# Patient Record
Sex: MALE | Race: WHITE | NOT HISPANIC OR LATINO | Employment: FULL TIME | URBAN - METROPOLITAN AREA
[De-identification: names, ages, dates, MRNs, and addresses within clinical notes are randomized per-mention and may not be internally consistent; named-entity substitution may affect disease eponyms.]

---

## 2019-11-22 ENCOUNTER — HOSPITAL ENCOUNTER (EMERGENCY)
Facility: HOSPITAL | Age: 19
Discharge: HOME | End: 2019-11-22
Attending: EMERGENCY MEDICINE
Payer: MEDICAID

## 2019-11-22 VITALS
HEART RATE: 94 BPM | HEIGHT: 69 IN | DIASTOLIC BLOOD PRESSURE: 73 MMHG | BODY MASS INDEX: 22.25 KG/M2 | SYSTOLIC BLOOD PRESSURE: 124 MMHG | RESPIRATION RATE: 18 BRPM | WEIGHT: 150.25 LBS | OXYGEN SATURATION: 100 % | TEMPERATURE: 99 F

## 2019-11-22 DIAGNOSIS — J06.9 UPPER RESPIRATORY TRACT INFECTION, UNSPECIFIED TYPE: Primary | ICD-10-CM

## 2019-11-22 LAB
DEPRECATED S PYO AG THROAT QL EIA: NEGATIVE
INFLUENZA A, MOLECULAR: NEGATIVE
INFLUENZA B, MOLECULAR: NEGATIVE
SPECIMEN SOURCE: NORMAL

## 2019-11-22 PROCEDURE — 87081 CULTURE SCREEN ONLY: CPT | Mod: ER

## 2019-11-22 PROCEDURE — 87880 STREP A ASSAY W/OPTIC: CPT | Mod: ER

## 2019-11-22 PROCEDURE — 87502 INFLUENZA DNA AMP PROBE: CPT | Mod: ER

## 2019-11-22 PROCEDURE — 99284 EMERGENCY DEPT VISIT MOD MDM: CPT | Mod: ER

## 2019-11-22 RX ORDER — FLUTICASONE PROPIONATE 50 MCG
1 SPRAY, SUSPENSION (ML) NASAL 2 TIMES DAILY PRN
Qty: 15 G | Refills: 0 | OUTPATIENT
Start: 2019-11-22 | End: 2020-09-24

## 2019-11-22 RX ORDER — BENZONATATE 100 MG/1
200 CAPSULE ORAL 3 TIMES DAILY PRN
Qty: 20 CAPSULE | Refills: 0 | Status: SHIPPED | OUTPATIENT
Start: 2019-11-22 | End: 2019-12-02

## 2019-11-22 NOTE — DISCHARGE INSTRUCTIONS
Supportive treatment drink plenty of fluids rest Motrin Tylenol for fevers decongestants up primary doctor next 1-2 days.

## 2019-11-22 NOTE — ED PROVIDER NOTES
Encounter Date: 11/22/2019       History     Chief Complaint   Patient presents with    Cough     Pt with c/o cough, body aches, chills, headache and nausea since Wednesday.     Chills    Headache     19-year-old male presented with complaint of cough body aches chills headache since Wednesday evening.  The patient did not receive flu shot this season sore throat as well        Review of patient's allergies indicates:  No Known Allergies  History reviewed. No pertinent past medical history.  History reviewed. No pertinent surgical history.  History reviewed. No pertinent family history.  Social History     Tobacco Use    Smoking status: Current Every Day Smoker     Packs/day: 0.50    Smokeless tobacco: Never Used   Substance Use Topics    Alcohol use: Yes     Frequency: Never     Comment: Occ    Drug use: Never     Review of Systems   Constitutional: Positive for fever.   HENT: Positive for congestion, rhinorrhea and sore throat.    Respiratory: Positive for cough.    Skin: Negative.    All other systems reviewed and are negative.      Physical Exam     Initial Vitals [11/22/19 1135]   BP Pulse Resp Temp SpO2   124/73 94 18 98.5 °F (36.9 °C) 100 %      MAP       --         Physical Exam    Nursing note and vitals reviewed.  Constitutional: He appears well-developed.   HENT:   Head: Normocephalic and atraumatic.   Eyes: EOM are normal. Pupils are equal, round, and reactive to light.   Neck: Normal range of motion. Neck supple.   Cardiovascular: Normal rate and regular rhythm.   Pulmonary/Chest: Breath sounds normal.   Abdominal: Soft. Bowel sounds are normal.   Neurological: He is alert and oriented to person, place, and time.   Skin: Skin is warm and dry.   Psychiatric: He has a normal mood and affect.         ED Course   Procedures  Labs Reviewed   INFLUENZA A & B BY MOLECULAR   THROAT SCREEN, RAPID   CULTURE, STREP A,  THROAT          Imaging Results    None          Medical Decision Making:   Differential  Diagnosis:   Strep   flu   URI   pneumonia  Clinical Tests:   Lab Tests: Ordered and Reviewed  ED Management:  Discussed results with patient advised continue supportive treatment complaining of fluids decongestants return for any worsening symptoms changes concern follow up with the primary doctor in next 1-2 days or as needed                                 Clinical Impression:       ICD-10-CM ICD-9-CM   1. Upper respiratory tract infection, unspecified type J06.9 465.9         Disposition:   Disposition: Discharged  Condition: Stable                     Aicha Roche PA-C  11/22/19 1234

## 2019-11-24 LAB — BACTERIA THROAT CULT: NORMAL

## 2020-03-06 ENCOUNTER — HOSPITAL ENCOUNTER (EMERGENCY)
Facility: HOSPITAL | Age: 20
Discharge: HOME | End: 2020-03-06
Attending: EMERGENCY MEDICINE
Payer: COMMERCIAL

## 2020-03-06 VITALS
WEIGHT: 140 LBS | DIASTOLIC BLOOD PRESSURE: 67 MMHG | OXYGEN SATURATION: 98 % | HEIGHT: 69 IN | HEART RATE: 96 BPM | SYSTOLIC BLOOD PRESSURE: 124 MMHG | RESPIRATION RATE: 16 BRPM | TEMPERATURE: 98 F | BODY MASS INDEX: 20.73 KG/M2
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
## 2020-03-06 DIAGNOSIS — F10.920 ALCOHOLIC INTOXICATION WITHOUT COMPLICATION: ICD-10-CM

## 2020-03-06 DIAGNOSIS — S16.1XXA CERVICAL STRAIN, ACUTE, INITIAL ENCOUNTER: ICD-10-CM

## 2020-03-06 DIAGNOSIS — M54.9 BACK PAIN: ICD-10-CM

## 2020-03-06 LAB
ALBUMIN SERPL BCP-MCNC: 4.2 G/DL (ref 3.5–5.2)
ALP SERPL-CCNC: 82 U/L (ref 55–135)
ALT SERPL W/O P-5'-P-CCNC: 19 U/L (ref 10–44)
ANION GAP SERPL CALC-SCNC: 10 MMOL/L (ref 8–16)
AST SERPL-CCNC: 24 U/L (ref 10–40)
BASOPHILS # BLD AUTO: 0.05 K/UL (ref 0–0.2)
BASOPHILS NFR BLD: 0.4 % (ref 0–1.9)
BILIRUB SERPL-MCNC: 0.2 MG/DL (ref 0.1–1)
BUN SERPL-MCNC: 10 MG/DL (ref 6–20)
CALCIUM SERPL-MCNC: 9 MG/DL (ref 8.7–10.5)
CHLORIDE SERPL-SCNC: 108 MMOL/L (ref 95–110)
CO2 SERPL-SCNC: 26 MMOL/L (ref 23–29)
CREAT SERPL-MCNC: 1 MG/DL (ref 0.5–1.4)
DIFFERENTIAL METHOD: ABNORMAL
EOSINOPHIL # BLD AUTO: 0 K/UL (ref 0–0.5)
EOSINOPHIL NFR BLD: 0.4 % (ref 0–8)
ERYTHROCYTE [DISTWIDTH] IN BLOOD BY AUTOMATED COUNT: 12 % (ref 11.5–14.5)
EST. GFR  (AFRICAN AMERICAN): >60 ML/MIN/1.73 M^2
EST. GFR  (NON AFRICAN AMERICAN): >60 ML/MIN/1.73 M^2
ETHANOL SERPL-MCNC: 206 MG/DL
GLUCOSE SERPL-MCNC: 91 MG/DL (ref 70–110)
HCT VFR BLD AUTO: 45.1 % (ref 40–54)
HGB BLD-MCNC: 15.2 G/DL (ref 14–18)
IMM GRANULOCYTES # BLD AUTO: 0.06 K/UL (ref 0–0.04)
IMM GRANULOCYTES NFR BLD AUTO: 0.5 % (ref 0–0.5)
LYMPHOCYTES # BLD AUTO: 2 K/UL (ref 1–4.8)
LYMPHOCYTES NFR BLD: 17.5 % (ref 18–48)
MCH RBC QN AUTO: 31.2 PG (ref 27–31)
MCHC RBC AUTO-ENTMCNC: 33.7 G/DL (ref 32–36)
MCV RBC AUTO: 93 FL (ref 82–98)
MONOCYTES # BLD AUTO: 0.9 K/UL (ref 0.3–1)
MONOCYTES NFR BLD: 7.7 % (ref 4–15)
NEUTROPHILS # BLD AUTO: 8.4 K/UL (ref 1.8–7.7)
NEUTROPHILS NFR BLD: 73.5 % (ref 38–73)
NRBC BLD-RTO: 0 /100 WBC
PLATELET # BLD AUTO: 291 K/UL (ref 150–350)
PMV BLD AUTO: 9.2 FL (ref 9.2–12.9)
POTASSIUM SERPL-SCNC: 4.1 MMOL/L (ref 3.5–5.1)
PROT SERPL-MCNC: 7.4 G/DL (ref 6–8.4)
RBC # BLD AUTO: 4.87 M/UL (ref 4.6–6.2)
SODIUM SERPL-SCNC: 144 MMOL/L (ref 136–145)
WBC # BLD AUTO: 11.38 K/UL (ref 3.9–12.7)

## 2020-03-06 PROCEDURE — 85025 COMPLETE CBC W/AUTO DIFF WBC: CPT

## 2020-03-06 PROCEDURE — 96360 HYDRATION IV INFUSION INIT: CPT

## 2020-03-06 PROCEDURE — 25500020 PHARM REV CODE 255: Performed by: EMERGENCY MEDICINE

## 2020-03-06 PROCEDURE — 63600175 PHARM REV CODE 636 W HCPCS: Performed by: EMERGENCY MEDICINE

## 2020-03-06 PROCEDURE — 96361 HYDRATE IV INFUSION ADD-ON: CPT

## 2020-03-06 PROCEDURE — 80320 DRUG SCREEN QUANTALCOHOLS: CPT

## 2020-03-06 PROCEDURE — 80053 COMPREHEN METABOLIC PANEL: CPT

## 2020-03-06 PROCEDURE — 99285 EMERGENCY DEPT VISIT HI MDM: CPT | Mod: 25

## 2020-03-06 RX ORDER — CYCLOBENZAPRINE HCL 10 MG
10 TABLET ORAL 3 TIMES DAILY PRN
Qty: 15 TABLET | Refills: 0 | Status: SHIPPED | OUTPATIENT
Start: 2020-03-06 | End: 2020-03-11

## 2020-03-06 RX ORDER — IBUPROFEN 400 MG/1
400 TABLET ORAL EVERY 6 HOURS PRN
Qty: 20 TABLET | Refills: 0 | Status: SHIPPED | OUTPATIENT
Start: 2020-03-06

## 2020-03-06 RX ADMIN — SODIUM CHLORIDE 1000 ML: 0.9 INJECTION, SOLUTION INTRAVENOUS at 01:03

## 2020-03-06 RX ADMIN — IOHEXOL 75 ML: 350 INJECTION, SOLUTION INTRAVENOUS at 03:03
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
## 2020-03-06 NOTE — DISCHARGE INSTRUCTIONS
Please wear your cervical collar until you receive further instructions from Dr. Mcconnell.  Please don't drink and drive.

## 2020-03-06 NOTE — ED NOTES
Warrant for whole blood from [facility] received from Officer Miguel Montero, verified pt's name and . Sealed kit #[id number] received from Officer Winston. Pt verbally verified name and . Specimen collection process explained to pt. Tourniquet applied to RUE. Area cleaned with non-alcohol containing skin prep from kit. Specimen obtained with sharps and tubes included in kit #[id number]. Sharps removed from pt, tourniquet removed from pt, bleeding controlled, pressure dressing applied to site. Specimens labels with kit #[id number] labels and pt identifiers. Kit #[id number] given to officer Winston, kit sealed, chain of custody complete at 3448.

## 2020-03-13 ENCOUNTER — HOSPITAL ENCOUNTER (EMERGENCY)
Facility: HOSPITAL | Age: 20
Discharge: HOME | End: 2020-03-13
Attending: PEDIATRICS

## 2020-03-13 VITALS
HEIGHT: 69 IN | WEIGHT: 145 LBS | DIASTOLIC BLOOD PRESSURE: 67 MMHG | SYSTOLIC BLOOD PRESSURE: 141 MMHG | RESPIRATION RATE: 18 BRPM | TEMPERATURE: 98 F | HEART RATE: 108 BPM | OXYGEN SATURATION: 100 % | BODY MASS INDEX: 21.48 KG/M2

## 2020-03-13 DIAGNOSIS — R07.81 RIB PAIN: ICD-10-CM

## 2020-03-13 PROCEDURE — 99283 EMERGENCY DEPT VISIT LOW MDM: CPT | Mod: 25

## 2020-03-13 PROCEDURE — 99284 PR EMERGENCY DEPT VISIT,LEVEL IV: ICD-10-PCS | Mod: ,,, | Performed by: PEDIATRICS

## 2020-03-13 PROCEDURE — 99284 EMERGENCY DEPT VISIT MOD MDM: CPT | Mod: ,,, | Performed by: PEDIATRICS

## 2020-03-13 RX ORDER — IBUPROFEN 600 MG/1
600 TABLET ORAL
Status: DISCONTINUED | OUTPATIENT
Start: 2020-03-13 | End: 2020-03-13

## 2020-03-13 RX ORDER — CYCLOBENZAPRINE HCL 10 MG
10 TABLET ORAL 3 TIMES DAILY PRN
COMMUNITY
End: 2020-09-24
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
## 2020-05-07 ENCOUNTER — HOSPITAL ENCOUNTER (EMERGENCY)
Facility: HOSPITAL | Age: 20
Discharge: HOME | End: 2020-05-07
Attending: EMERGENCY MEDICINE

## 2020-05-07 VITALS
RESPIRATION RATE: 16 BRPM | DIASTOLIC BLOOD PRESSURE: 73 MMHG | BODY MASS INDEX: 22.96 KG/M2 | SYSTOLIC BLOOD PRESSURE: 124 MMHG | HEART RATE: 88 BPM | OXYGEN SATURATION: 100 % | HEIGHT: 69 IN | TEMPERATURE: 98 F | WEIGHT: 155 LBS

## 2020-05-07 DIAGNOSIS — R10.9 RIGHT FLANK PAIN: Primary | ICD-10-CM

## 2020-05-07 LAB
ALBUMIN SERPL BCP-MCNC: 4.4 G/DL (ref 3.5–5.2)
ALP SERPL-CCNC: 76 U/L (ref 55–135)
ALT SERPL W/O P-5'-P-CCNC: 16 U/L (ref 10–44)
ANION GAP SERPL CALC-SCNC: 7 MMOL/L (ref 8–16)
AST SERPL-CCNC: 19 U/L (ref 10–40)
BASOPHILS # BLD AUTO: 0.04 K/UL (ref 0–0.2)
BASOPHILS NFR BLD: 0.6 % (ref 0–1.9)
BILIRUB SERPL-MCNC: 0.5 MG/DL (ref 0.1–1)
BILIRUB UR QL STRIP: NEGATIVE
BUN SERPL-MCNC: 12 MG/DL (ref 6–20)
CALCIUM SERPL-MCNC: 10.1 MG/DL (ref 8.7–10.5)
CHLORIDE SERPL-SCNC: 101 MMOL/L (ref 95–110)
CLARITY UR: CLEAR
CO2 SERPL-SCNC: 30 MMOL/L (ref 23–29)
COLOR UR: YELLOW
CREAT SERPL-MCNC: 1.3 MG/DL (ref 0.5–1.4)
DIFFERENTIAL METHOD: ABNORMAL
EOSINOPHIL # BLD AUTO: 0.1 K/UL (ref 0–0.5)
EOSINOPHIL NFR BLD: 2.2 % (ref 0–8)
ERYTHROCYTE [DISTWIDTH] IN BLOOD BY AUTOMATED COUNT: 11.9 % (ref 11.5–14.5)
EST. GFR  (AFRICAN AMERICAN): >60 ML/MIN/1.73 M^2
EST. GFR  (NON AFRICAN AMERICAN): >60 ML/MIN/1.73 M^2
GLUCOSE SERPL-MCNC: 96 MG/DL (ref 70–110)
GLUCOSE UR QL STRIP: NEGATIVE
HCT VFR BLD AUTO: 46.4 % (ref 40–54)
HGB BLD-MCNC: 15.8 G/DL (ref 14–18)
HGB UR QL STRIP: NEGATIVE
IMM GRANULOCYTES # BLD AUTO: 0.02 K/UL (ref 0–0.04)
IMM GRANULOCYTES NFR BLD AUTO: 0.3 % (ref 0–0.5)
KETONES UR QL STRIP: NEGATIVE
LEUKOCYTE ESTERASE UR QL STRIP: NEGATIVE
LYMPHOCYTES # BLD AUTO: 2.3 K/UL (ref 1–4.8)
LYMPHOCYTES NFR BLD: 37 % (ref 18–48)
MCH RBC QN AUTO: 31.6 PG (ref 27–31)
MCHC RBC AUTO-ENTMCNC: 34.1 G/DL (ref 32–36)
MCV RBC AUTO: 93 FL (ref 82–98)
MONOCYTES # BLD AUTO: 0.5 K/UL (ref 0.3–1)
MONOCYTES NFR BLD: 8.6 % (ref 4–15)
NEUTROPHILS # BLD AUTO: 3.2 K/UL (ref 1.8–7.7)
NEUTROPHILS NFR BLD: 51.3 % (ref 38–73)
NITRITE UR QL STRIP: NEGATIVE
NRBC BLD-RTO: 0 /100 WBC
PH UR STRIP: 6 [PH] (ref 5–8)
PLATELET # BLD AUTO: 289 K/UL (ref 150–350)
PMV BLD AUTO: 9.1 FL (ref 9.2–12.9)
POTASSIUM SERPL-SCNC: 4.3 MMOL/L (ref 3.5–5.1)
PROT SERPL-MCNC: 7.6 G/DL (ref 6–8.4)
PROT UR QL STRIP: NEGATIVE
RBC # BLD AUTO: 5 M/UL (ref 4.6–6.2)
SODIUM SERPL-SCNC: 138 MMOL/L (ref 136–145)
SP GR UR STRIP: 1.02 (ref 1–1.03)
URN SPEC COLLECT METH UR: NORMAL
UROBILINOGEN UR STRIP-ACNC: NEGATIVE EU/DL
WBC # BLD AUTO: 6.3 K/UL (ref 3.9–12.7)

## 2020-05-07 PROCEDURE — 96361 HYDRATE IV INFUSION ADD-ON: CPT

## 2020-05-07 PROCEDURE — 63600175 PHARM REV CODE 636 W HCPCS: Performed by: EMERGENCY MEDICINE

## 2020-05-07 PROCEDURE — 25000003 PHARM REV CODE 250: Performed by: EMERGENCY MEDICINE

## 2020-05-07 PROCEDURE — 80053 COMPREHEN METABOLIC PANEL: CPT

## 2020-05-07 PROCEDURE — 99284 EMERGENCY DEPT VISIT MOD MDM: CPT | Mod: 25

## 2020-05-07 PROCEDURE — 85025 COMPLETE CBC W/AUTO DIFF WBC: CPT

## 2020-05-07 PROCEDURE — 81003 URINALYSIS AUTO W/O SCOPE: CPT

## 2020-05-07 PROCEDURE — 96375 TX/PRO/DX INJ NEW DRUG ADDON: CPT

## 2020-05-07 PROCEDURE — 96374 THER/PROPH/DIAG INJ IV PUSH: CPT

## 2020-05-07 RX ORDER — ONDANSETRON 2 MG/ML
4 INJECTION INTRAMUSCULAR; INTRAVENOUS
Status: COMPLETED | OUTPATIENT
Start: 2020-05-07 | End: 2020-05-07

## 2020-05-07 RX ORDER — KETOROLAC TROMETHAMINE 30 MG/ML
15 INJECTION, SOLUTION INTRAMUSCULAR; INTRAVENOUS
Status: COMPLETED | OUTPATIENT
Start: 2020-05-07 | End: 2020-05-07

## 2020-05-07 RX ORDER — ONDANSETRON 4 MG/1
4 TABLET, ORALLY DISINTEGRATING ORAL EVERY 6 HOURS PRN
Qty: 12 TABLET | Refills: 0 | OUTPATIENT
Start: 2020-05-07 | End: 2020-09-24

## 2020-05-07 RX ADMIN — KETOROLAC TROMETHAMINE 15 MG: 30 INJECTION, SOLUTION INTRAMUSCULAR; INTRAVENOUS at 08:05

## 2020-05-07 RX ADMIN — ONDANSETRON 4 MG: 2 INJECTION INTRAMUSCULAR; INTRAVENOUS at 08:05

## 2020-05-07 RX ADMIN — SODIUM CHLORIDE 1000 ML: 0.9 INJECTION, SOLUTION INTRAVENOUS at 08:05

## 2020-05-07 NOTE — ED NOTES
Patient stated that he has RLQ abdominal pain that radiates to his right flank area that started tues. Patient stated the pain is constant with nausea and vomiting. Patient denies any difficulty urinating but has had frequency with urination. Patient stated he has vomited x 4 episodes today and last po intake was around 630 am. VSS, will continue to [facility]

## 2020-05-07 NOTE — ED PROVIDER NOTES
Encounter Date: 5/7/2020    SCRIBE #1 NOTE: I, Dominic Bauman , am scribing for, and in the presence of,  . I have scribed the entire note.       History     Chief Complaint   Patient presents with    Abdominal Pain     cc abdominal pain that started on tues and has worsened. patient denies fever chills, sob, coughing    Emesis     vomiting since tues, patient vomited x4 episodes today. last po intake this am 630     Edward Villa is a 20 y.o. male who  has no past medical history on file.    The patient presents to the ED due to abdominal pain and vomiting that started 2 days ago. Symptoms has worsened since onset. He describes the abdominal pain as being in his intestines and radiating toward the back.This morning the patient experienced x4 episodes of vomiting. He denies any fever, chills, SOB, coughing or blood in the urine. Patient admits that his last PO intake was this morning around 6am.        The history is provided by the patient.     Review of patient's allergies indicates:  No Known Allergies  No past medical history on file.  No past surgical history on file.  No family history on file.  Social History     Tobacco Use    Smoking status: Current Every Day Smoker     Packs/day: 0.50    Smokeless tobacco: Never Used   Substance Use Topics    Alcohol use: Yes     Frequency: Never     Comment: Occ    Drug use: Never     Review of Systems   Constitutional: Negative for chills and fever.   Respiratory: Negative for cough and shortness of breath.    Gastrointestinal: Positive for abdominal pain and vomiting.   Genitourinary: Negative for dysuria.   All other systems reviewed and are negative.      Physical Exam     Initial Vitals [05/07/20 0804]   BP Pulse Resp Temp SpO2   124/73 88 16 98.3 °F (36.8 °C) 100 %      MAP       --         Physical Exam    Constitutional: He appears well-developed and well-nourished.   HENT:   Head: Normocephalic and atraumatic.   Eyes: Pupils are equal, round, and  reactive to light.   Cardiovascular: Normal rate, regular rhythm and normal heart sounds.   Pulmonary/Chest: Breath sounds normal.   Abdominal: Soft. Normal appearance and bowel sounds are normal. There is tenderness in the right lower quadrant. There is no rebound and no guarding.   Musculoskeletal: He exhibits tenderness.   Right CVA tenderness    Neurological: He is alert and oriented to person, place, and time.   Skin: Skin is warm.         ED Course   Procedures  Labs Reviewed   CBC W/ AUTO DIFFERENTIAL - Abnormal; Notable for the following components:       Result Value    Mean Corpuscular Hemoglobin 31.6 (*)     MPV 9.1 (*)     All other components within normal limits   COMPREHENSIVE METABOLIC PANEL - Abnormal; Notable for the following components:    CO2 30 (*)     Anion Gap 7 (*)     All other components within normal limits   URINALYSIS          Imaging Results          CT Renal Stone Study ABD Pelvis WO (Final result)  Result time 05/07/20 09:26:08    Final result by Erwin Dunbar MD (05/07/20 09:26:08)                 Impression:      1. Small nonobstructing stone in right kidney.  No ureter stone or obstruction on either side.  2. No acute abnormality identified in abdomen or pelvis.      Electronically signed by: Erwin Dunbar MD  Date:    05/07/2020  Time:    09:26             Narrative:    EXAMINATION:  CT RENAL STONE STUDY ABD PELVIS WO    CLINICAL HISTORY:  Flank pain, stone disease suspected;    TECHNIQUE:  Low dose axial images, sagittal and coronal reformations were obtained from the lung bases to the pubic symphysis.  Contrast was not administered.    COMPARISON:  CT chest abdomen pelvis 03/06/2020    FINDINGS:  Visualized lung bases are clear.  No free air is seen in the upper abdomen.    The spleen, liver, gallbladder, bile ducts, and pancreas are within normal limits on less sensitive, noncontrast examination.    Adrenal glands are normal.    Left kidney is normal size and shows  no stone, obstruction, or obvious mass.  Left ureter is normal caliber and clear to the bladder.    The right kidney is normal size and has a 3 mm nonobstructing stone in the upper pole.  No obvious renal mass is detected.  Right ureter is normal caliber and clear to the bladder.    Wall of the urinary bladder is smooth.  Prostate and seminal vesicles look normal.    Abdominal aorta tapers normally.  No enlarged retroperitoneal lymph nodes are seen.    No significant ascites is identified in the abdomen or pelvis.  Intestinal tract shows no obstruction or acute inflammatory process.  Some fluid is present in the stomach.  Small bowel is normal caliber.  Normal appendix is visualized (coronal series 601, image 35).  Stool is present throughout the colon.    Soft tissues of the abdominal wall look normal.    Skeletal structures are intact.                                 Medical Decision Making:   Clinical Tests:   Lab Tests: Reviewed and Ordered  ED Management:  20-year-old male with right flank pain and vomiting.  Lab work is unremarkable.  CT shows a normal appendix.  There is a nonobstructing stone in the right kidney, but none seen in the ureter and there is no hydronephrosis or hydroureter.  Re-examination of the patient yields no abdominal tenderness.  He has no pain or nausea.  There is the possibility he may have already passed a kidney stone.  I will write him a prescription for Zofran to use as needed and suggest Motrin for pain.  He will follow-up with the primary physician but most importantly return to the ED for any recurrence of pain, vomiting, difficulty urinating, fever or any other concerns.                                 Clinical Impression:       ICD-10-CM ICD-9-CM   1. Right flank pain R10.9 789.09                  I, Dr. Obi Miller, personally performed the services described in this documentation. All medical record entries made by the scribe were at my direction and in my presence. I have  reviewed the chart and agree that the record reflects my personal performance and is accurate and complete. Obi Aaron MD.  2:16 PM 05/07/2020                 Obi Aaron MD  05/07/20 5252

## 2020-07-22 ENCOUNTER — HOSPITAL ENCOUNTER (EMERGENCY)
Facility: HOSPITAL | Age: 20
Discharge: LEFT BEFORE DISCHARGE | End: 2020-07-22
Payer: OTHER GOVERNMENT

## 2020-07-22 VITALS
HEART RATE: 81 BPM | RESPIRATION RATE: 14 BRPM | OXYGEN SATURATION: 100 % | DIASTOLIC BLOOD PRESSURE: 66 MMHG | HEIGHT: 70 IN | WEIGHT: 155 LBS | SYSTOLIC BLOOD PRESSURE: 133 MMHG | BODY MASS INDEX: 22.19 KG/M2 | TEMPERATURE: 99 F

## 2020-07-22 PROCEDURE — 99283 EMERGENCY DEPT VISIT LOW MDM: CPT

## 2020-07-22 RX ORDER — ONDANSETRON 4 MG/1
4 TABLET, ORALLY DISINTEGRATING ORAL
Status: DISCONTINUED | OUTPATIENT
Start: 2020-07-22 | End: 2020-07-23 | Stop reason: HOSPADM

## 2020-07-22 NOTE — PROVIDER PROGRESS NOTES - EMERGENCY DEPT.
" Emergency Department TeleTRIAGE Encounter Note      CHIEF COMPLAINT    Chief Complaint   Patient presents with    Emesis     Patient presents to the ED with reports of having approximately x 8 episodes of vomiting while at work tonight. Patient states with the last x 2 episodes having blood tinged vomit stating "It looked like a couple table spoons of it".        VITAL SIGNS   Initial Vitals [07/22/20 1650]   BP Pulse Resp Temp SpO2   133/66 81 14 99.2 °F (37.3 °C) 100 %      MAP       --            ALLERGIES    Review of patient's allergies indicates:  No Known Allergies    PROVIDER TRIAGE NOTE  This is a teletriage evaluation of a 20 y.o. male presenting to the ED with c/o multiple episodes of vomiting.  Reports no abdominal pain.  Reports no sick contacts.  Patient is not tachycardic, he is normotensive.  He is not febrile. Initial orders will be placed and care will be transferred to an alternate provider when patient is roomed for a full evaluation. Any additional orders and the final disposition will be determined by that provider.         ORDERS  Labs Reviewed - No data to display    ED Orders (720h ago, onward)    Start Ordered     Status Ordering Provider    07/22/20 1715 07/22/20 1700  ondansetron disintegrating tablet 4 mg  ED 1 Time      Ordered KEON SMITH            Virtual Visit Note: The provider triage portion of this emergency department evaluation and documentation was performed via [employer], a HIPAA-compliant telemedicine application, in concert with a tele-presenter in the room. A face to face patient evaluation with one of my colleagues will occur once the patient is placed in an emergency department room.      DISCLAIMER: This note was prepared with [employer] voice recognition transcription software. Garbled syntax, mangled pronouns, and other bizarre constructions may be attributed to that software system.  "

## 2020-09-24 ENCOUNTER — HOSPITAL ENCOUNTER (EMERGENCY)
Facility: HOSPITAL | Age: 20
Discharge: HOME | End: 2020-09-24
Attending: FAMILY MEDICINE
Payer: OTHER GOVERNMENT

## 2020-09-24 VITALS
SYSTOLIC BLOOD PRESSURE: 134 MMHG | HEIGHT: 70 IN | WEIGHT: 160 LBS | HEART RATE: 84 BPM | TEMPERATURE: 98 F | DIASTOLIC BLOOD PRESSURE: 75 MMHG | OXYGEN SATURATION: 99 % | BODY MASS INDEX: 22.9 KG/M2 | RESPIRATION RATE: 20 BRPM

## 2020-09-24 DIAGNOSIS — B34.9 ACUTE VIRAL SYNDROME: Primary | ICD-10-CM

## 2020-09-24 PROCEDURE — 99284 EMERGENCY DEPT VISIT MOD MDM: CPT | Mod: ER

## 2020-09-24 PROCEDURE — U0003 INFECTIOUS AGENT DETECTION BY NUCLEIC ACID (DNA OR RNA); SEVERE ACUTE RESPIRATORY SYNDROME CORONAVIRUS 2 (SARS-COV-2) (CORONAVIRUS DISEASE [COVID-19]), AMPLIFIED PROBE TECHNIQUE, MAKING USE OF HIGH THROUGHPUT TECHNOLOGIES AS DESCRIBED BY CMS-2020-01-R: HCPCS | Mod: ER

## 2020-09-24 RX ORDER — DICYCLOMINE HYDROCHLORIDE 20 MG/1
20 TABLET ORAL 3 TIMES DAILY PRN
Qty: 15 TABLET | Refills: 0 | Status: SHIPPED | OUTPATIENT
Start: 2020-09-24 | End: 2020-10-24

## 2020-09-24 RX ORDER — ONDANSETRON 4 MG/1
4 TABLET, FILM COATED ORAL EVERY 8 HOURS PRN
Qty: 10 TABLET | Refills: 0 | Status: SHIPPED | OUTPATIENT
Start: 2020-09-24 | End: 2021-04-08

## 2020-09-24 NOTE — DISCHARGE INSTRUCTIONS
Home quarantine per CDC guidelines at least until you receive your results.  If negative return to work 72 hr after resolution of symptoms.  If positive 10 days from onset of symptoms and 24 hr after symptom resolution per current CDC recommendations.

## 2020-09-24 NOTE — ED PROVIDER NOTES
NAME:  Edward Villa  CSN:     [id number]  MRN:    [id number]  ADMIT DATE: 9/24/2020        EMERGENCY DEPARTMENT ENCOUNTER    CHIEF COMPLAINT    Chief Complaint   Patient presents with    Cough     Pt reports being in contact with someone COVID +. Pt c/o fatigue, cough, fatigue. PA speaking to pt on iPad.    Diarrhea    Weakness         HPI    Edward Villa is a 20 y.o. male who  has no past medical history on file.    Patient presents with complaint of dry cough and diarrhea for 1 day.  He has been in close contact with a co-worker that tested positive for COVID-19.  No chest pain or shortness of breath.  No change in sense of smell or taste.  No treatment prior to arrival.  They are not a healthcare worker, immunocompromised due to transplant, medications/chemotherapy or active cancer.   Patient does not receive hemodialysis for ESRD nor do they have chronic lung disease.  The patient does not live/work in a communal setting such as a nursing/group home or shelter.           ALLERGIES    Review of patient's allergies indicates:  No Known Allergies      PAST MEDICAL HISTORY  History reviewed. No pertinent past medical history.      SURGICAL HISTORY    History reviewed. No pertinent surgical history.      SOCIAL HISTORY    Social History     Socioeconomic History    Marital status: Single     Spouse name: Not on file    Number of children: Not on file    Years of education: Not on file    Highest education level: Not on file   Occupational History    Not on file   Social Needs    Financial resource strain: Not on file    Food insecurity     Worry: Not on file     Inability: Not on file    Transportation needs     Medical: Not on file     Non-medical: Not on file   Tobacco Use    Smoking status: Current Every Day Smoker     Packs/day: 0.50     Types: Vaping with nicotine    Smokeless tobacco: Never Used   Substance and Sexual Activity    Alcohol use: Yes     Frequency: Never     Comment: Occ    Drug  use: Never    Sexual activity: Not on file   Lifestyle    Physical activity     Days per week: Not on file     Minutes per session: Not on file    Stress: Not on file   Relationships    Social connections     Talks on phone: Not on file     Gets together: Not on file     Attends Shinto service: Not on file     Active member of club or organization: Not on file     Attends meetings of clubs or organizations: Not on file     Relationship status: Not on file   Other Topics Concern    Not on file   Social History Narrative    Not on file         FAMILY HISTORY    History reviewed. No pertinent family history.      REVIEW OF SYSTEMS   Review of Systems  As per HPI and below:  --  General:  (-)  fever, (-)  chills, (+)   fatigue, (-) appetite change   --  HENT:  (-)  congestion, (-) sore throat , - anosmia  --  EYE: (-) visual changes, (-) eye pain    --  Allergy and Immunology:  (-) seasonal allergies  --  Hematological:  (-) easy bleeding/bruising  --  Respiratory:  (+)  cough, (-)  shortness of breath  --  Cardiovascular:  (-) chest pain  --  Gastrointestinal:  (-) abdominal pain, (+) diarrhea (-) nausea   --  Genito-Urinary:  (-) dysuria, (-) flank pain    --  Dermatological:  (-) rash   --  Musculoskeletal:  (-) myalgias, (-) back pain   --  Neurological:  (-) for headache  --  Psychological:  (-) sleep disturbance   --  All other systems reviewed and otherwise negative.      PHYSICAL EXAM    Reviewed Triage Note    VITAL SIGNS:   Initial Vitals [09/24/20 1227]   BP Pulse Resp Temp SpO2   134/75 84 20 98.4 °F (36.9 °C) 99 %      MAP       --              Physical Exam    Nursing Notes and Triage Vitals reviewed by me.    Telemedicine exam performed via [employer]     Constitutional:  Well developed, well nourished, no apparent distress.  HENT:  Normocephalic, atraumatic.  Nose:  No rhinorrhea or discharge noted.  Pharynx:  Mucous membranes moist, no erythema per tele-presenter.  No tenderness to  frontal/maxillary sinuses on exam by tele-presenter.  Eyes:  No conjunctival injection, pallor or icterus.  No discharge.  Neck: Normal range of motion.  No cervical adenopathy palpated per patient self-exam.  Respiratory:  No respiratory distress or conversational dyspnea. No accessory muscle use or retractions.  Cardiovascular:  No perioral cyanosis, cap refill < 2 sec on patient self-exam.  Musculoskeletal:  No gross deformity or limited range of motion of all major joints.  Integument:  Warm and dry. No rash.  Neurologic:  Alert and oriented x3, GCS 15. Moving all extremities. No aphasia.   Psychiatric:  Affect normal. Mood normal.  Normal behavior.        LABS  Pertinent labs reviewed. (See chart for details)   Labs Reviewed   SARS-COV-2 (COVID-19) QUALITATIVE PCR         RADIOLOGY    Imaging Results    None           PROCEDURES    Procedures      EKG               ED COURSE & MEDICAL DECISION MAKING:  Advised patient to home quarantine per Covid 19 protocol. Rx for bentyl for diarrhea. Advised on supportive care. Return to the ED for chest pain, shortness of breath or if worse in any way.       Medications - No data to display              IMPRESSION:    ICD-10-CM ICD-9-CM   1. Acute viral syndrome  B34.9 079.99         DISPOSITION:   ED Disposition Condition    Discharge Stable              PRESCRIPTIONS:   ED Prescriptions     Medication Sig Dispense Start Date End Date Auth. Provider    dicyclomine (BENTYL) 20 mg tablet Take 1 tablet (20 mg total) by mouth 3 (three) times daily as needed (diarrhea). 15 tablet 9/24/2020 10/24/2020 MONIK Guzman    ondansetron (ZOFRAN) 4 MG tablet Take 1 tablet (4 mg total) by mouth every 8 (eight) hours as needed. 10 tablet 9/24/2020  MONIK Guzman               Virtual Onsite Care Note:  This emergency department encounter was performed via [employer], a HIPAA-compliant virtual exam application, in concert with a tele-presenter in the room. A face to  face evaluation was available to the patient in the case it was deemed necessary by me or the Emergency Department staff.       DISCLAIMER: This note was prepared with [employer] voice recognition transcription software. Garbled syntax, mangled pronouns, and other bizarre constructions may be attributed to that software system.         MONIK Guzman  09/24/20 7820

## 2020-09-24 NOTE — Clinical Note
"Edward"Dmitry Villa was seen and treated in our emergency department on 9/24/2020.     COVID-19 is present in our communities across the state. There is limited testing for COVID at this time, so not all patients can be tested. In this situation, your employee meets the following criteria:    Edward Villa has met the criteria for COVID-19 testing based upon symptoms, travel, and/or potential exposure. The test has been completed and is pending results at this time. During this time the employee is not able to work and should be quarantined per the Centers for Disease Control timelines.     If you have any questions or concerns, or if I can be of further assistance, please do not hesitate to contact me.    Sincerely,             MONIK Guzman"

## 2020-09-25 LAB — SARS-COV-2 RNA RESP QL NAA+PROBE: NOT DETECTED

## 2021-04-08 ENCOUNTER — HOSPITAL ENCOUNTER (EMERGENCY)
Facility: HOSPITAL | Age: 21
Discharge: HOME | End: 2021-04-08
Attending: EMERGENCY MEDICINE

## 2021-04-08 VITALS
WEIGHT: 160 LBS | BODY MASS INDEX: 23.7 KG/M2 | DIASTOLIC BLOOD PRESSURE: 65 MMHG | TEMPERATURE: 98 F | RESPIRATION RATE: 17 BRPM | OXYGEN SATURATION: 99 % | SYSTOLIC BLOOD PRESSURE: 114 MMHG | HEIGHT: 69 IN | HEART RATE: 102 BPM

## 2021-04-08 DIAGNOSIS — E86.0 DEHYDRATION: ICD-10-CM

## 2021-04-08 DIAGNOSIS — F10.920 ALCOHOLIC INTOXICATION WITHOUT COMPLICATION: ICD-10-CM
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
## 2021-04-08 DIAGNOSIS — R00.0 TACHYCARDIA: ICD-10-CM

## 2021-04-08 DIAGNOSIS — S02.19XA CLOSED FRACTURE OF FRONTAL SINUS, INITIAL ENCOUNTER: Primary | ICD-10-CM

## 2021-04-08 DIAGNOSIS — S00.81XA ABRASION OF FACE, INITIAL ENCOUNTER: ICD-10-CM

## 2021-04-08 DIAGNOSIS — S00.83XA TRAUMATIC HEMATOMA OF FOREHEAD, INITIAL ENCOUNTER: ICD-10-CM

## 2021-04-08 DIAGNOSIS — R00.0 SINUS TACHYCARDIA: ICD-10-CM

## 2021-04-08 PROCEDURE — 25000003 PHARM REV CODE 250: Mod: ER | Performed by: EMERGENCY MEDICINE

## 2021-04-08 PROCEDURE — 96361 HYDRATE IV INFUSION ADD-ON: CPT | Mod: ER

## 2021-04-08 PROCEDURE — 96360 HYDRATION IV INFUSION INIT: CPT | Mod: ER

## 2021-04-08 PROCEDURE — 99285 EMERGENCY DEPT VISIT HI MDM: CPT | Mod: 25,ER

## 2021-04-08 RX ORDER — SODIUM CHLORIDE 9 MG/ML
INJECTION, SOLUTION INTRAVENOUS
Status: COMPLETED | OUTPATIENT
Start: 2021-04-08 | End: 2021-04-08

## 2021-04-08 RX ORDER — NAPROXEN SODIUM 220 MG
220 TABLET ORAL
COMMUNITY

## 2021-04-08 RX ORDER — AMOXICILLIN AND CLAVULANATE POTASSIUM 875; 125 MG/1; MG/1
1 TABLET, FILM COATED ORAL 2 TIMES DAILY
Qty: 14 TABLET | Refills: 0 | Status: SHIPPED | OUTPATIENT
Start: 2021-04-08

## 2021-04-08 RX ADMIN — SODIUM CHLORIDE: 0.9 INJECTION, SOLUTION INTRAVENOUS at 01:04

## 2021-04-08 RX ADMIN — BACITRACIN ZINC, POLYMYXIN B SULFATE, NEOMYCIN SULFATE 1 EACH: 400; 5000; 3.5 OINTMENT TOPICAL at 04:04

## 2024-09-30 NOTE — DISCHARGE INSTRUCTIONS
A referral to sports medicine has been sent, please call to schedule follow up if pain continues.     Continue to use Ibuprofen 800mg twice to three times daily, use on a full stomach.     Please return to ER if you experience chest pain on left side associated with left arm numbness, dizziness, passing out and/or palpitations. Severe headache not responding to medication associated with change in consciousness, speech difficulty, trouble walking or recurrent vomiting.  Or any other concerns.   
01-Oct-2024 01:21